# Patient Record
Sex: FEMALE | Race: WHITE
[De-identification: names, ages, dates, MRNs, and addresses within clinical notes are randomized per-mention and may not be internally consistent; named-entity substitution may affect disease eponyms.]

---

## 2018-03-09 ENCOUNTER — HOSPITAL ENCOUNTER (EMERGENCY)
Dept: HOSPITAL 45 - C.EDB | Age: 28
Discharge: HOME | End: 2018-03-09
Payer: SELF-PAY

## 2018-03-09 VITALS
WEIGHT: 284.4 LBS | HEIGHT: 67.01 IN | WEIGHT: 284.4 LBS | HEIGHT: 67.01 IN | BODY MASS INDEX: 44.64 KG/M2 | BODY MASS INDEX: 44.64 KG/M2

## 2018-03-09 VITALS — DIASTOLIC BLOOD PRESSURE: 76 MMHG | HEART RATE: 62 BPM | SYSTOLIC BLOOD PRESSURE: 132 MMHG | OXYGEN SATURATION: 96 %

## 2018-03-09 VITALS — TEMPERATURE: 98.78 F

## 2018-03-09 DIAGNOSIS — E66.3: ICD-10-CM

## 2018-03-09 DIAGNOSIS — N93.9: ICD-10-CM

## 2018-03-09 DIAGNOSIS — R10.2: Primary | ICD-10-CM

## 2018-03-09 LAB
ALBUMIN SERPL-MCNC: 3.8 GM/DL (ref 3.4–5)
ALP SERPL-CCNC: 77 U/L (ref 45–117)
ALT SERPL-CCNC: 52 U/L (ref 12–78)
AST SERPL-CCNC: 28 U/L (ref 15–37)
BASOPHILS # BLD: 0.02 K/UL (ref 0–0.2)
BASOPHILS NFR BLD: 0.4 %
BUN SERPL-MCNC: 11 MG/DL (ref 7–18)
CALCIUM SERPL-MCNC: 8.9 MG/DL (ref 8.5–10.1)
CO2 SERPL-SCNC: 26 MMOL/L (ref 21–32)
CREAT SERPL-MCNC: 0.83 MG/DL (ref 0.6–1.2)
EOS ABS #: 0.1 K/UL (ref 0–0.5)
EOSINOPHIL NFR BLD AUTO: 174 K/UL (ref 130–400)
GLUCOSE SERPL-MCNC: 89 MG/DL (ref 70–99)
HCT VFR BLD CALC: 42.7 % (ref 37–47)
HGB BLD-MCNC: 15.4 G/DL (ref 12–16)
IG#: 0.02 K/UL (ref 0–0.02)
IMM GRANULOCYTES NFR BLD AUTO: 23.1 %
INR PPP: 1 (ref 0.9–1.1)
LYMPHOCYTES # BLD: 1.08 K/UL (ref 1.2–3.4)
MCH RBC QN AUTO: 30.9 PG (ref 25–34)
MCHC RBC AUTO-ENTMCNC: 36.1 G/DL (ref 32–36)
MCV RBC AUTO: 85.7 FL (ref 80–100)
MONO ABS #: 0.48 K/UL (ref 0.11–0.59)
MONOCYTES NFR BLD: 10.3 %
NEUT ABS #: 2.97 K/UL (ref 1.4–6.5)
NEUTROPHILS # BLD AUTO: 2.1 %
NEUTROPHILS NFR BLD AUTO: 63.7 %
PMV BLD AUTO: 9.4 FL (ref 7.4–10.4)
POTASSIUM SERPL-SCNC: 3.7 MMOL/L (ref 3.5–5.1)
PROT SERPL-MCNC: 7.6 GM/DL (ref 6.4–8.2)
PTT PATIENT: 27.5 SECONDS (ref 21–31)
RED CELL DISTRIBUTION WIDTH CV: 12.6 % (ref 11.5–14.5)
RED CELL DISTRIBUTION WIDTH SD: 39.4 FL (ref 36.4–46.3)
SODIUM SERPL-SCNC: 136 MMOL/L (ref 136–145)
WBC # BLD AUTO: 4.67 K/UL (ref 4.8–10.8)

## 2018-03-09 NOTE — EMERGENCY ROOM VISIT NOTE
ED Visit Note


First contact with patient:  17:53


, CHIEF COMPLAINT: Pelvic pain, vaginal bleeding 2 days





HISTORY OF PRESENT ILLNESS: Patient is a  27-year-old Muslim female who 

presents the emergency department accompanied by her  and mother-in-law 

for evaluation of pelvic pain and vaginal bleeding.  Patient reports that her 

last menstrual period was 2018.  She had a positive home pregnancy test 2 

days ago.  She states that later that day she developed some brownish vaginal 

discharge, bleeding became heavier and bright red yesterday.  Early in the day 

she states she was changing her pad every hour or so, but it tapered off 

throughout the day.  Bleeding again picked up today.  She reports pain across 

her entire lower abdomen, with associated back pain, headache, dizziness and 

nausea.  She put a hot water bottle on her abdomen.  She did not take any 

medication for pain.  She rates her discomfort a 10/10 presently.  She reports 

that she is otherwise healthy without any chronic medical problems, and no 

surgeries.  She does not see a physician regularly.  She is not taking any 

medications.  She denies any urinary symptoms.  She reports that her bowel 

movements have been a little sluggish over the last couple of days.  No 

diarrhea.  She has not vomited.  She has not been evaluated for her pregnancy 

yet.





REVIEW OF SYSTEMS: Review of systems as per HPI.  All other systems reviewed 

were negative.  10 systems reviewed.





PMH: The patient is otherwise healthy without chronic medical problems or 

surgeries.





SOCIAL HISTORY:  Patient lives at home with her family.  


  


PHYSICAL EXAM: Vital Signs: Reviewed Nurse's notes.  


CONSTITUTIONAL: Patient is an obese 27-year-old white female who is awake and 

alert and in no acute distress.  Vital signs are stable.  Her  and mother

-in-law are at the bedside.


EYES:  Pupils equal, round, reactive to light and accommodation.  EOMs intact 

without nystagmus.  Sclera are anicteric. 


ENT:  Tympanic membranes intact, with normal landmarks.  External canals are 

clear.  Oral and nasopharynx are clear.  Mucous membranes are moist, no lesions

, tongue and gums appear normal.     


NECK: Supple without lymphadenopathy.  No thyromegaly.  No meningeal signs.  

Full active range of motion without discomfort.


CARDIOVASCULAR: Regular rate and rhythm, with normal S1 and S2, no murmur or 

gallop or rub is heard.  No carotid bruits auscultated.  No JVD.  Peripheral 

pulses easily palpable.


RESPIRATORY: Breath sounds equal and clear to auscultation without wheezes, 

rales, or rhonchi heard.   Full and equal chest expansion without accessory 

muscle use or retractions. 


ABDOMEN: Bowel sounds are present.  Abdomen is soft obese, nontender to 

percussion throughout.  She has some very mild suprapubic discomfort.  There is 

no guarding, rebound or rigidity.  There is no pain in the right upper 

quadrant.  There is no pain in the right lower quadrant over McBurney's point.


Pelvic Exam:


     Genitalia are normal


     Vagina is clean, scant blood noted in the vaginal vault.


     Cervix is healthy appearing, closed, no lesions noted.  No cervical motion 

tenderness.  No active bleeding through the cervical os. 


     The uterus is small, nontender


     The adnexa no masses, nontender


INTEGUMENTARY: No lesions or rash, normal skin turgor. 


LYMPH: No lymphadenopathy.  





EMERGENCY DEPARTMENT COURSE: The patient was seen and evaluated as above.  She 

presents the emergency department with pelvic pain and vaginal bleeding in the 

setting of a positive home pregnancy test.  IV lock was initiated and 

laboratory studies were collected.  She was hydrated with normal saline 

solution.  She was medicated initially with fentanyl 50 mcg and Zofran 4 mg IV.

  Laboratory studies were collected including CBC with differential coags, CMP, 

TSH, urinalysis and type and screen.





Laboratory studies noted a normal white count at 4600, H&H is normal at 15.4 

and 42.7, platelet count is normal.  Coags are unremarkable.  Electrolytes, 

renal functions and liver functions are all within normal limits.  TSH is 

indicative of a euthyroid state.  Her quantitative hCG is less than 1.  

Urinalysis is indicative of contamination, with greater than 30 epithelial 

cells and greater than 30 RBCs.  She does have some white blood cells and leuk 

esterase, but no nitrates and no bacteria.  





I discussed with the patient and her family her laboratory studies.  With a 

quant less than 1, it is unlikely that she is pregnant.  The patient at this 

point was medicated with Toradol 30 mg IV.  Discussed with them the possibility 

of a false positive pregnancy test or a very early miscarriage.  Given the 

pelvic pain, we did proceed with a pelvic ultrasound which did not show any 

acute abnormality.  Pelvic exam was performed as above.  Cultures are obtained 

and are pending.  All laboratory and diagnostic imaging studies were reviewed 

with the patient and her family. Differential diagnoses entertained included 

irregular menses, dysmenorrhea, pregnancy, ectopic pregnancy, miscarriage, 

ovarian cyst, ovarian torsion, UTI, cystitis, coagulopathy, among others.  The 

patient reported improvement in her symptoms.  Bleeding had almost completely 

stopped at the time of discharge.  She is otherwise well-appearing and 

hemodynamically stable.  There is no evidence for anemia or dehydration.  

Family question whether stress or anxiety could also play a role in her symptoms

, and this was discussed.  She was encouraged to follow-up with her primary 

care provider for further care and management, particularly if her symptoms 

persist.  Patient was discharged home with family in good condition. 





Medication reconciliation: I attest that I have personally reviewed the patient'

s current medication list.





Blood pressure screening : Patient was found to have normal blood pressure on 

screening and does not require follow-up.








EXAMINATION: PELVIC ULTRASOUND





CLINICAL HISTORY: PELVIC PAIN    





COMPARISON STUDY:  None





FINDINGS: 


The uterus measured 6.9 x 3.3 x 3.8 cm.


The endometrial stripe measured 2 mm.


The right ovary was nonvisualized.


The left ovary measured 31 x 28 x 15 mm.


   


There was no evidence of pathologic free pelvic fluid.





The patient refused endovaginal scanning





IMPRESSION:  


1. Technically limited transabdominal study


2. Nonvisualization right ovary.


3. The left ovary appeared normal as visualized


4. No uterine abnormalities identified.





EXAMINATION: PELVIC ULTRASOUND (endovaginal study)





CLINICAL HISTORY: Pelvic Pain    





COMPARISON STUDY:  Earlier today





FINDINGS: 


The uterus measured 7.1 x 4.0 x 5.3 cm.


The endometrial stripe measured 12 mm and mildly heterogeneous.


The right ovary was not visualized.


The left ovary measured 31 x 18 x 21 mm.


   


There is trace free fluid likely physiologic.





IMPRESSION:  


1. Slightly heterogeneous endometrial stripe measuring 12 mm


2. No uterine masses


3. Sonographically normal left ovary


4. Nonvisualization of the right ovary


Current/Historical Medications


No Active Prescriptions or Reported Meds





Allergies


Coded Allergies:  


     Amoxicillin (Verified  Allergy, Mild, HIVES, SWELLING, 3/9/18)





Vital Signs











  Date Time  Temp Pulse Resp B/P (MAP) Pulse Ox O2 Delivery O2 Flow Rate FiO2


 


3/9/18 22:08  62 16 132/76 96 Room Air  


 


3/9/18 20:09  75 16 139/98 96 Room Air  


 


3/9/18 17:49 37.1 88 18 134/84 97 Room Air  











Laboratory Results


3/9/18 18:26








Red Blood Count 4.98, Mean Corpuscular Volume 85.7, Mean Corpuscular Hemoglobin 

30.9, Mean Corpuscular Hemoglobin Concent 36.1, Mean Platelet Volume 9.4, 

Neutrophils (%) (Auto) 63.7, Lymphocytes (%) (Auto) 23.1, Monocytes (%) (Auto) 

10.3, Eosinophils (%) (Auto) 2.1, Basophils (%) (Auto) 0.4, Neutrophils # (Auto

) 2.97, Lymphocytes # (Auto) 1.08, Monocytes # (Auto) 0.48, Eosinophils # (Auto

) 0.10, Basophils # (Auto) 0.02





3/9/18 18:26

















Test


  3/9/18


18:26 3/9/18


19:06 3/9/18


22:00


 


White Blood Count


  4.67 K/uL


(4.8-10.8) 


  


 


 


Red Blood Count


  4.98 M/uL


(4.2-5.4) 


  


 


 


Hemoglobin


  15.4 g/dL


(12.0-16.0) 


  


 


 


Hematocrit 42.7 % (37-47)   


 


Mean Corpuscular Volume


  85.7 fL


() 


  


 


 


Mean Corpuscular Hemoglobin


  30.9 pg


(25-34) 


  


 


 


Mean Corpuscular Hemoglobin


Concent 36.1 g/dl


(32-36) 


  


 


 


Platelet Count


  174 K/uL


(130-400) 


  


 


 


Mean Platelet Volume


  9.4 fL


(7.4-10.4) 


  


 


 


Neutrophils (%) (Auto) 63.7 %   


 


Lymphocytes (%) (Auto) 23.1 %   


 


Monocytes (%) (Auto) 10.3 %   


 


Eosinophils (%) (Auto) 2.1 %   


 


Basophils (%) (Auto) 0.4 %   


 


Neutrophils # (Auto)


  2.97 K/uL


(1.4-6.5) 


  


 


 


Lymphocytes # (Auto)


  1.08 K/uL


(1.2-3.4) 


  


 


 


Monocytes # (Auto)


  0.48 K/uL


(0.11-0.59) 


  


 


 


Eosinophils # (Auto)


  0.10 K/uL


(0-0.5) 


  


 


 


Basophils # (Auto)


  0.02 K/uL


(0-0.2) 


  


 


 


RDW Standard Deviation


  39.4 fL


(36.4-46.3) 


  


 


 


RDW Coefficient of Variation


  12.6 %


(11.5-14.5) 


  


 


 


Immature Granulocyte % (Auto) 0.4 %   


 


Immature Granulocyte # (Auto)


  0.02 K/uL


(0.00-0.02) 


  


 


 


Prothrombin Time


  10.0 SECONDS


(9.0-12.0) 


  


 


 


Prothromb Time International


Ratio 1.0 (0.9-1.1) 


  


  


 


 


Activated Partial


Thromboplast Time 27.5 SECONDS


(21.0-31.0) 


  


 


 


Partial Thromboplastin Ratio 1.1   


 


Anion Gap


  6.0 mmol/L


(3-11) 


  


 


 


Est Creatinine Clear Calc


Drug Dose 144.9 ml/min 


  


  


 


 


Estimated GFR (


American) 113.6 


  


  


 


 


Estimated GFR (Non-


American 98.0 


  


  


 


 


BUN/Creatinine Ratio 12.8 (10-20)   


 


Calcium Level


  8.9 mg/dl


(8.5-10.1) 


  


 


 


Total Bilirubin


  0.7 mg/dl


(0.2-1) 


  


 


 


Aspartate Amino Transf


(AST/SGOT) 28 U/L (15-37) 


  


  


 


 


Alanine Aminotransferase


(ALT/SGPT) 52 U/L (12-78) 


  


  


 


 


Alkaline Phosphatase


  77 U/L


() 


  


 


 


Total Protein


  7.6 gm/dl


(6.4-8.2) 


  


 


 


Albumin


  3.8 gm/dl


(3.4-5.0) 


  


 


 


Globulin


  3.8 gm/dl


(2.5-4.0) 


  


 


 


Albumin/Globulin Ratio 1.0 (0.9-2)   


 


Thyroid Stimulating Hormone


(TSH) 2.480 uIu/ml


(0.300-4.500) 


  


 


 


Human Chorionic Gonadotropin,


Quant < 1 mIU/mL 


  


  


 


 


Urine Color  ORANGE  


 


Urine Appearance  CLOUDY (CLEAR)  


 


Urine pH  5.5 (4.5-7.5)  


 


Urine Specific Gravity


  


  1.019


(1.000-1.030) 


 


 


Urine Protein  TRACE (NEG)  


 


Urine Glucose (UA)  NEG (NEG)  


 


Urine Ketones  NEG (NEG)  


 


Urine Occult Blood  3+ (NEG)  


 


Urine Nitrite  NEG (NEG)  


 


Urine Bilirubin  NEG (NEG)  


 


Urine Urobilinogen  NEG (NEG)  


 


Urine Leukocyte Esterase  SMALL (NEG)  


 


Urine WBC (Auto)


  


  10-30 /hpf


(0-5) 


 


 


Urine RBC (Auto)  >30 /hpf (0-4)  


 


Urine Hyaline Casts (Auto)  1-5 /lpf (0-5)  


 


Urine Epithelial Cells (Auto)  >30 /lpf (0-5)  


 


Urine Bacteria (Auto)  NEG (NEG)  











Medications Administered











 Medications


  (Trade)  Dose


 Ordered  Sig/Bassam


 Route  Start Time


 Stop Time Status Last Admin


Dose Admin


 


 Sodium Chloride  1,000 ml @ 


 999 mls/hr  Q1H1M STAT


 IV  3/9/18 18:12


 3/9/18 19:12 DC 3/9/18 18:28


999 MLS/HR


 


 Sodium Chloride  1,000 ml @ 


 250 mls/hr  Q4H STAT


 IV  3/9/18 18:12


 3/9/18 22:11 DC 3/9/18 18:12


250 MLS/HR


 


 Ondansetron HCl


  (Zofran Inj)  4 mg  NOW  STAT


 IV  3/9/18 18:12


 3/9/18 18:14 DC 3/9/18 18:27


4 MG


 


 Fentanyl Citrate


  (Fentanyl Inj)  50 mcg  NOW  STAT


 IV  3/9/18 18:12


 3/9/18 18:14 DC 3/9/18 18:28


50 MCG


 


 Ketorolac


 Tromethamine


  (Toradol Inj)  30 mg  NOW  STAT


 IV  3/9/18 19:24


 3/9/18 19:25 DC 3/9/18 19:24


30 MG











Departure Information


Impression





 Primary Impression:  


 Pelvic pain


 Additional Impression:  


 Vaginal bleeding





Prescriptions





No Active Prescriptions or Reported Meds





Referrals


No Doctor, Assigned (PCP)





Patient Instructions


[facility]





Additional Instructions





Ibuprofen(Motrin, Advil) may be used for fever or pain.  Use 600mg every six 

hours as needed.  Take with food.  Avoid using more than 2400mg in a 24 hour 

period.  Do not use 2400mg per day for more than three consecutive days without 

physician direction.  Prolonged inappropriate use can lead to stomach upset or 

ulcers. 


(AND/OR)


Acetaminophen(Tylenol) may be used for fever or pain.  Use 1000mg every six 

hours as needed.  Avoid using more than 3000mg in a 24 hour period.  





Rest and drink plenty of fluids.  Diet as tolerated.





Return to the ED for worsening pain, heavier bleeding (soaking a pad in an hour 

or less, passing clots larger than your fist), passing out, worsening of her 

condition or as needed.





Follow-up with your primary care physician/OB/GYN for recheck next week.





Problem Qualifiers

## 2018-03-09 NOTE — DIAGNOSTIC IMAGING REPORT
EXAMINATION: PELVIC ULTRASOUND



CLINICAL HISTORY: PELVIC PAIN    



COMPARISON STUDY:  None



FINDINGS: 

The uterus measured 6.9 x 3.3 x 3.8 cm.

The endometrial stripe measured 2 mm.

The right ovary was nonvisualized.

The left ovary measured 31 x 28 x 15 mm.

   

There was no evidence of pathologic free pelvic fluid.



The patient refused endovaginal scanning



IMPRESSION:  

1. Technically limited transabdominal study

2. Nonvisualization right ovary.

3. The left ovary appeared normal as visualized

4. No uterine abnormalities identified.







Electronically signed by:  Waylon Wilson M.D.

3/9/2018 8:10 PM



Dictated Date/Time:  3/9/2018 8:08 PM

## 2018-03-09 NOTE — DIAGNOSTIC IMAGING REPORT
EXAMINATION: PELVIC ULTRASOUND (endovaginal study)



CLINICAL HISTORY: Pelvic Pain    



COMPARISON STUDY:  Earlier today



FINDINGS: 

The uterus measured 7.1 x 4.0 x 5.3 cm.

The endometrial stripe measured 12 mm and mildly heterogeneous.

The right ovary was not visualized.

The left ovary measured 31 x 18 x 21 mm.

   

There is trace free fluid likely physiologic.



IMPRESSION:  

1. Slightly heterogeneous endometrial stripe measuring 12 mm

2. No uterine masses

3. Sonographically normal left ovary

4. Nonvisualization of the right ovary







Electronically signed by:  Waylon Wilson M.D.

3/9/2018 9:23 PM



Dictated Date/Time:  3/9/2018 9:21 PM